# Patient Record
Sex: MALE | Race: WHITE
[De-identification: names, ages, dates, MRNs, and addresses within clinical notes are randomized per-mention and may not be internally consistent; named-entity substitution may affect disease eponyms.]

---

## 2018-10-01 ENCOUNTER — HOSPITAL ENCOUNTER (OUTPATIENT)
Dept: HOSPITAL 41 - JD.SDS | Age: 68
LOS: 1 days | Discharge: HOME | End: 2018-10-02
Attending: ORTHOPAEDIC SURGERY
Payer: MEDICARE

## 2018-10-01 DIAGNOSIS — G60.9: ICD-10-CM

## 2018-10-01 DIAGNOSIS — M10.9: ICD-10-CM

## 2018-10-01 DIAGNOSIS — G47.33: ICD-10-CM

## 2018-10-01 DIAGNOSIS — E03.9: ICD-10-CM

## 2018-10-01 DIAGNOSIS — M17.11: Primary | ICD-10-CM

## 2018-10-01 DIAGNOSIS — Z79.899: ICD-10-CM

## 2018-10-01 DIAGNOSIS — Z99.89: ICD-10-CM

## 2018-10-01 PROCEDURE — C1776 JOINT DEVICE (IMPLANTABLE): HCPCS

## 2018-10-01 RX ADMIN — DEXTROSE ONE GM: 5 SOLUTION INTRAVENOUS at 14:36

## 2018-10-01 RX ADMIN — IODINE ONE ML: 20; 20.4; 47 LIQUID TOPICAL at 14:25

## 2018-10-01 RX ADMIN — IODINE ONE ML: 20; 20.4; 47 LIQUID TOPICAL at 14:04

## 2018-10-01 RX ADMIN — SODIUM CHLORIDE ONE MG: 9 INJECTION, SOLUTION INTRAMUSCULAR; INTRAVENOUS; SUBCUTANEOUS at 14:05

## 2018-10-01 RX ADMIN — SODIUM CHLORIDE ONE: 9 INJECTION, SOLUTION INTRAMUSCULAR; INTRAVENOUS; SUBCUTANEOUS at 17:59

## 2018-10-01 RX ADMIN — DEXTROSE ONE GM: 5 SOLUTION INTRAVENOUS at 14:05

## 2018-10-01 RX ADMIN — OXYCODONE HYDROCHLORIDE AND ACETAMINOPHEN PRN TAB: 5; 325 TABLET ORAL at 22:47

## 2018-10-01 RX ADMIN — SODIUM CHLORIDE ONE MG: 9 INJECTION, SOLUTION INTRAMUSCULAR; INTRAVENOUS; SUBCUTANEOUS at 14:33

## 2018-10-01 NOTE — PCM.POSTAN
POST ANESTHESIA ASSESSMENT





- MENTAL STATUS


Mental Status: Alert, Oriented





- VITAL SIGNS


Pulse Rate: 61


SaO2: 97


Resp Rate: 16


Blood Pressure: 102/63


Temperature: 37.2 C





- RESPIRATORY


Respiratory Status: Respiratory Rate WNL, Airway Patent, O2 Saturation Stable, 

Supplemental Oxygen





- CARDIOVASCULAR


CV Status: Pulse Rate WNL, Blood Pressure Stable





- GASTROINTESTINAL


GI Status: No Symptoms





- PAIN


Pain Score: 0





- POST OP HYDRATION


Hydration Status: Adequate & Stable

## 2018-10-01 NOTE — PCM.SN
- Free Text/Narrative


Note: 





Right selective femoral nerve block at the adductor canal for post-procedure 

pain control


Time Out: 1520


Start: 1526


End:  1529





Chart reviewed.  Consent signed.  Questions answered. Appropriate monitors 

applied.  Time out performed. Right mid-shaft femur evaluated with ultrasound.  

Scanning medially femur, I was able to identify the femoral artery in the 

adductor canal.  The saphenous nerve was lateral to the artery. The skin was 

prepped lateral to the ultrasound probe with chlorahexadine. The 21ga 4 

insulated block needle was inserted under direct ultrasound guidance into the 

adductor canal.  20mL of 0.5% ropivacaine with 1:200,000 epinephrine was 

injected cirmcumferentially about the nerve with intermittent negative 

aspiration every 5mL.  Patient tolerated the procedure well.  See pictures on 

progress note and vital signs on nurses notes.  Block completed 

postoperatively.  





Harjeet Montenegro CRNA

## 2018-10-01 NOTE — PCM.SN
- Free Text/Narrative


Note: 


In to see Torres s/p R TKA POD 0. Overall he is doing well. He has no 

complaints at this time. Pain is controlled. Denies any HA, N/V/D, abdominal 

pain, Chest pain, SOB, numbness/tingling,  complaints. O2 2L NC. He does not 

have a urinary catheter. Incentive Spirometry. Neurovascularly intact with 2+ 

pulses in bilateral lower extremities. Bandage is dry and intact. He is working 

with PT. No concerns from nursing. Will likely be D/C'd tomorrow pending 

clinical disposition and labs.

## 2018-10-02 RX ADMIN — OXYCODONE HYDROCHLORIDE AND ACETAMINOPHEN PRN TAB: 5; 325 TABLET ORAL at 03:48

## 2018-10-02 RX ADMIN — OXYCODONE HYDROCHLORIDE AND ACETAMINOPHEN PRN TAB: 5; 325 TABLET ORAL at 10:52

## 2018-10-02 RX ADMIN — OXYCODONE HYDROCHLORIDE AND ACETAMINOPHEN PRN TAB: 5; 325 TABLET ORAL at 08:09

## 2018-10-02 NOTE — PCM.SURGPN
- General Info


Date of Service: 10/02/18


POD#: 1


Functional Status: Reports: Pain Controlled, Tolerating Diet, Ambulating, 

Urinating, Incentive Spirometry





- Review of Systems


Musculoskeletal: Reports: Other (The pt states his pain is tolerable.)





- Patient Data


Vitals - Most Recent: 


 Last Vital Signs











Temp  97.2 F   10/02/18 03:34


 


Pulse  59 L  10/02/18 03:34


 


Resp  16   10/02/18 03:34


 


BP  130/78   10/02/18 03:34


 


Pulse Ox  97   10/02/18 03:34











Weight - Most Recent: 218 lb


I&O - Last 24 Hours: 


 Intake & Output











 10/01/18 10/02/18 10/02/18





 22:59 06:59 14:59


 


Intake Total 905 246 


 


Output Total 1525 350 


 


Balance -620 -104 











Lab Results Last 24 Hrs: 


 Laboratory Results - last 24 hr











  10/01/18 10/02/18 10/02/18 Range/Units





  10:50 05:45 05:45 


 


WBC   13.74 H   (4.23-9.07)  K/mm3


 


RBC   4.10 L   (4.63-6.08)  M/mm3


 


Hgb   13.6 L   (13.7-17.5)  gm/L


 


Hct   40.6   (40.1-51.0)  %


 


MCV   99.0 H   (79.0-92.2)  fl


 


MCH   33.2 H   (25.7-32.2)  pg


 


MCHC   33.5   (32.2-35.5)  g/dl


 


RDW Std Deviation   45.1 H   (35.1-43.9)  fL


 


Plt Count   143 L   (163-337)  K/mm3


 


MPV   11.5   (9.4-12.3)  fl


 


PT  10.8    (9.5-12.1)  SECONDS


 


INR  0.99    


 


Sodium    140  (136-145)  mEq/L


 


Potassium    3.9  (3.5-5.1)  mEq/L


 


Chloride    107  ()  mEq/L


 


Carbon Dioxide    25  (21-32)  mEq/L


 


Anion Gap    11.9  (5-15)  


 


BUN    17  (7-18)  mg/dL


 


Creatinine    1.0  (0.7-1.3)  mg/dL


 


Est Cr Clr Drug Dosing    67.94  mL/min


 


Estimated GFR (MDRD)    > 60  (>60)  mL/min


 


BUN/Creatinine Ratio    17.0  (14-18)  


 


Glucose    114  ()  mg/dL


 


Calcium    8.6  (8.5-10.1)  mg/dL


 


Total Bilirubin    0.8  (0.2-1.0)  mg/dL


 


AST    21  (15-37)  U/L


 


ALT    26  (16-63)  U/L


 


Alkaline Phosphatase    39 L  ()  U/L


 


Total Protein    6.4  (6.4-8.2)  g/dl


 


Albumin    3.2 L  (3.4-5.0)  g/dl


 


Globulin    3.2  gm/dL


 


Albumin/Globulin Ratio    1.0  (1-2)  











Med Orders - Current: 


 Current Medications





Allopurinol (Zyloprim)  300 mg PO DAILY [facility]


Aspirin (Ecotrin)  325 mg PO BID [facility]


Bisacodyl (Dulcolax)  5 mg PO DAILY PRN


   PRN Reason: Constipation


Cholecalciferol (Vitamin D3)  5,000 unit PO DAILY [facility]


Cyclobenzaprine HCl (Flexeril)  10 mg PO TID PRN


   PRN Reason: Spasms


Docusate Sodium (Colace)  100 mg PO BID [facility]


   Last Admin: 10/01/18 20:42 Dose:  100 mg


Famotidine (Pepcid)  20 mg PO Q12H [facility]


   Last Admin: 10/01/18 20:42 Dose:  20 mg


Cefazolin Sodium/Dextrose 2 gm (/ Premix)  50 mls @ 100 mls/hr IV Q8H [facility]


   Stop: 10/02/18 13:29


   Last Admin: 10/02/18 05:12 Dose:  100 mls/hr


Ketorolac Tromethamine (Toradol)  15 mg IVPUSH Q6H PRN


   PRN Reason: Pain


Levothyroxine Sodium (Synthroid)  88 mcg PO ACBREAKFAST [facility]


   Last Admin: 10/02/18 05:10 Dose:  88 mcg


Magnesium Hydroxide (Milk Of Magnesia)  30 ml PO BID PRN


   PRN Reason: Constipation


Morphine Sulfate (Morphine)  2 mg IVPUSH Q2H PRN


   PRN Reason: Breakthrough Pain


Multivitamins (Thera)  1 each PO DAILY [facility]


Naloxone HCl (Narcan)  0.1 mg IVPUSH Q5M PRN


   PRN Reason: Oversedation


Ondansetron HCl (Zofran)  4 mg IVPUSH Q6H PRN


   PRN Reason: Nausea/Vomiting


Oxycodone/Acetaminophen (Percocet 325-5 Mg)  1 - 2 tab PO Q4H PRN


   PRN Reason: Pain


   Last Admin: 10/02/18 03:48 Dose:  1 tab


Senna (Senna)  8.6 mg PO BID PRN


   PRN Reason: Constipation





Discontinued Medications





Acetaminophen (Tylenol)  650 mg PO ONETIME [facility]


   Stop: 10/01/18 15:00


   Last Admin: 10/01/18 12:11 Dose:  650 mg


Bupivacaine HCl (Marcaine 0.25%) Confirm Administered Dose 30 ml .ROUTE .STK-

MED ONE


   Stop: 10/01/18 12:08


   Last Admin: 10/01/18 14:34 Dose:  30 ml


Cefazolin Sodium (Ancef) Confirm Administered Dose 2 gm .ROUTE .STK-MED ONE


   Stop: 10/01/18 12:08


   Last Admin: 10/01/18 14:28 Dose:  2 gm


Cefazolin Sodium (Ancef) Confirm Administered Dose 2 gm .ROUTE .STK-MED ONE


   Stop: 10/01/18 12:53


Morphine Sulfate 8 mg/Epinephrine HCl 0.3 mg/Cefuroxime Sodium 750 mg/Ketorolac 

Tromethamine 30 mg/Sodium Chloride 27.9 ml  0 mg .XX ONETIME ONE


   Stop: 10/01/18 13:31


   Last Admin: 10/01/18 17:59 Dose:  Not Given


Dexamethasone (Dexamethasone) Confirm Administered Dose 20 mg .ROUTE .STK-MED 

ONE


   Stop: 10/01/18 12:53


Diphenhydramine HCl (Benadryl)  25 mg IVPUSH Q6H PRN


   PRN Reason: Pruritis


   Stop: 10/01/18 23:00


Epinephrine HCl (Adrenalin) Confirm Administered Dose 1 mg .ROUTE .STK-MED ONE


   Stop: 10/01/18 08:21


Fentanyl (Sublimaze) Confirm Administered Dose 100 mcg .ROUTE .STK-MED ONE


   Stop: 10/01/18 12:45


Fentanyl (Sublimaze)  50 mcg IVPUSH Q5M PRN


   PRN Reason: Pain


   Stop: 10/01/18 23:00


Lactated Ringer's (Ringers, Lactated)  1,000 mls @ 125 mls/hr IV ASDIRECTED [facility]


   Stop: 10/01/18 23:00


   Last Admin: 10/01/18 10:55 Dose:  125 mls/hr


Lactated Ringer's (Ringers, Lactated) Confirm Administered Dose 1,000 mls @ as 

directed .ROUTE .ST-MED ONE


   Stop: 10/01/18 13:28


Iodine (Iodine 2% Mild Tincture) Confirm Administered Dose 30 ml .ROUTE .STK-

MED ONE


   Stop: 10/01/18 12:08


   Last Admin: 10/01/18 14:25 Dose:  18 ml


Ketamine HCl (Ketalar) Confirm Administered Dose 500 mg .ROUTE .STK-MED ONE


   Stop: 10/01/18 12:45


Lidocaine/Sodium Bicarbonate (Buffered Lidocaine 1% In Ns 8.4%)  0.25 ml IDERM 

ONETIME PRN


   PRN Reason: Prior to IV Start


   Stop: 10/01/18 18:00


   Last Admin: 10/01/18 10:55 Dose:  0.25 ml


Meperidine HCl (Meperidine)  12.5 mg IVPUSH ONETIME PRN


   PRN Reason: Shivering


   Stop: 10/01/18 23:00


Midazolam HCl (Versed 1 Mg/Ml) Confirm Administered Dose 2 mg .ROUTE .STK-MED 

ONE


   Stop: 10/01/18 12:45


Non-Formulary Medication (Chromium Picolinate [Chromium Picolinate])  400 mcg 

PO DAILY [facility]


Ondansetron HCl (Zofran) Confirm Administered Dose 4 mg .ROUTE .STK-MED ONE


   Stop: 10/01/18 12:53


Ondansetron HCl (Zofran)  4 mg IVPUSH ONETIME PRN


   PRN Reason: Nausea/Vomiting


   Stop: 10/01/18 23:00


Oxycodone HCl (Oxycontin)  10 mg PO ONETIME [facility]


   Stop: 10/01/18 16:00


   Last Admin: 10/01/18 12:10 Dose:  10 mg


Pregabalin (Lyrica)  50 mg PO ONETIME [facility]


   Stop: 10/01/18 15:00


   Last Admin: 10/01/18 12:10 Dose:  50 mg


Propofol (Diprivan  20 Ml) Confirm Administered Dose 600 mg .ROUTE .STK-MED ONE


   Stop: 10/01/18 12:38


Propofol (Diprivan  20 Ml) Confirm Administered Dose 200 mg .ROUTE .STK-MED ONE


   Stop: 10/01/18 14:42


Ropivacaine (Naropin 0.5%) Confirm Administered Dose 30 ml .ROUTE .STK-MED ONE


   Stop: 10/01/18 08:22


Sodium Chloride (Saline Flush)  10 ml FLUSH ASDIRECTED PRN


   PRN Reason: Keep Vein Open


   Stop: 10/01/18 18:00


Tranexamic Acid (Cyklokapron) Confirm Administered Dose 1,000 mg .ROUTE .STK-

MED ONE


   Stop: 10/01/18 12:08


   Last Admin: 10/01/18 14:40 Dose:  1,000 mg


Vancomycin HCl (Vancomycin) Confirm Administered Dose 1 gm .ROUTE .STK-MED ONE


   Stop: 10/01/18 12:08


   Last Admin: 10/01/18 14:36 Dose:  1 gm











- Exam


Wound/Incisions: Dressing Dry and Intact


General: Alert, Cooperative, No Acute Distress


Lungs: Normal Respiratory Effort


Extremities: Other (NVS intact for BLE.  Jami's negative.)





- Problem List Review


Problem List Initiated/Reviewed/Updated: Yes





- My Orders


Last 24 Hours: 


 Active Orders 24 hr











 Category Date Time Status


 


 Patient Status [ADT] Routine ADT  10/01/18 07:11 Active


 


 Ambulate [RC] PER UNIT ROUTINE Care  10/01/18 07:11 Active


 


 Communication Order [RC] ROUTINE Care  10/01/18 15:11 Active


 


 Cooling Warming Measures [RC] ASDIRECTED Care  10/01/18 15:11 Inactive


 


 May Shower [RC] ASDIRECTED Care  10/01/18 07:11 Active


 


 Notify Provider Consults [RC] ASDIRECTED Care  10/01/18 07:14 Active


 


 Notify Provider [RC] ASDIRECTED Care  10/01/18 15:11 Active


 


 Oxygen Therapy [RC] .PRN Care  10/01/18 15:11 Active


 


 Pulse Oximetry [RC] ASDIRECTED Care  10/01/18 15:11 Active


 


 RT Incentive Spirometry [RC] Q1HWA Care  10/01/18 07:10 Active


 


 Ready for Discharge [RC] PER UNIT ROUTINE Care  10/02/18 07:52 Ordered


 


 Up to Chair [RC] ASDIRECTED Care  10/01/18 07:11 Active


 


 Urinary Catheter Removal [RC] 2325 Care  10/01/18 07:10 Active


 


 Vital Signs [RC] Q15M Care  10/01/18 15:11 Inactive


 


 Vital Signs [RC] Q4HR Care  10/01/18 07:11 Active


 


 Consult to Physician [CONS] Routine Cons  10/01/18 07:11 Active


 


 OT Evaluation and Treatment [CONS] Routine Cons  10/01/18 07:10 Active


 


 PT Evaluation and Treatment [CONS] Routine Cons  10/01/18 07:10 Active


 


 Regular Diet [DIET] Diet  10/01/18 Lunch Active


 


 Knee 1V or 2V Rt [CR] Timed Exams  10/01/18 07:10 Taken


 


 Acetaminophen/oxyCODONE [Percocet 325-5 MG] Med  10/01/18 07:10 Active





 1 - 2 tab PO Q4H PRN   


 


 Allopurinol [Zyloprim] Med  10/02/18 09:00 Active





 300 mg PO DAILY   


 


 Aspirin [Ecotrin] Med  10/02/18 09:00 Active





 325 mg PO BID   


 


 Bisacodyl [Dulcolax] Med  10/01/18 07:11 Active





 5 mg PO DAILY PRN   


 


 Cholecalciferol (Vitamin D3) [Vitamin D3] Med  10/02/18 09:00 Active





 5,000 unit PO DAILY   


 


 Cyclobenzaprine [Flexeril] Med  10/01/18 07:10 Active





 10 mg PO TID PRN   


 


 Docusate Sodium [Colace] Med  10/01/18 09:00 Active





 100 mg PO BID   


 


 Famotidine [Pepcid] Med  10/01/18 21:00 Active





 20 mg PO Q12H   


 


 Ketorolac [Toradol] Med  10/01/18 07:10 Active





 15 mg IVPUSH Q6H PRN   


 


 Levothyroxine [Synthroid] Med  10/02/18 06:00 Active





 88 mcg PO ACBREAKFAST   


 


 Magnesium Hydroxide [Milk of Magnesia] Med  10/01/18 07:11 Active





 30 ml PO BID PRN   


 


 Morphine Med  10/01/18 07:11 Active





 2 mg IVPUSH Q2H PRN   


 


 Multivitamins,Therapeutic [Thera] Med  10/02/18 09:00 Active





 1 each PO DAILY   


 


 Naloxone [Narcan] Med  10/01/18 07:11 Active





 0.1 mg IVPUSH Q5M PRN   


 


 Ondansetron [Zofran] Med  10/01/18 07:11 Active





 4 mg IVPUSH Q6H PRN   


 


 Sennosides [Senna] Med  10/01/18 07:11 Active





 8.6 mg PO BID PRN   


 


 ceFAZolin [Ancef] 2 gm Med  10/01/18 21:00 Active





 Premix Bag 1 bag   





 IV Q8H   


 


 Antiembolic Hose [OM.PC] Per Unit Routine Oth  10/01/18 07:13 Ordered


 


 Ice Therapy [OM.PC] Per Unit Routine Oth  10/01/18 07:12 Ordered


 


 Sequential Compression Device [OM.PC] Per Unit Routine Oth  10/01/18 07:10 

Ordered


 


 Resuscitation Status Routine Resus Stat  10/01/18 07:11 Ordered








 Medication Orders





Allopurinol (Zyloprim)  300 mg PO DAILY [facility]


Aspirin (Ecotrin)  325 mg PO BID [facility]


Bisacodyl (Dulcolax)  5 mg PO DAILY PRN


   PRN Reason: Constipation


Cholecalciferol (Vitamin D3)  5,000 unit PO DAILY [facility]


Cyclobenzaprine HCl (Flexeril)  10 mg PO TID PRN


   PRN Reason: Spasms


Docusate Sodium (Colace)  100 mg PO BID [facility]


   Last Admin: 10/01/18 20:42  Dose: 100 mg


   Admin: 10/01/18 16:49 Dose:  Not Given


Famotidine (Pepcid)  20 mg PO Q12H [facility]


   Last Admin: 10/01/18 20:42  Dose: 20 mg


Cefazolin Sodium/Dextrose 2 gm (/ Premix)  50 mls @ 100 mls/hr IV Q8H [facility]


   Stop: 10/02/18 13:29


   Last Admin: 10/02/18 05:12  Dose: 100 mls/hr


   Infusion: 10/01/18 21:13  Dose: 100 mls/hr


   Admin: 10/01/18 20:43  Dose: 100 mls/hr


Ketorolac Tromethamine (Toradol)  15 mg IVPUSH Q6H PRN


   PRN Reason: Pain


Levothyroxine Sodium (Synthroid)  88 mcg PO ACBREAKFAST [facility]


   Last Admin: 10/02/18 05:10  Dose: 88 mcg


Magnesium Hydroxide (Milk Of Magnesia)  30 ml PO BID PRN


   PRN Reason: Constipation


Morphine Sulfate (Morphine)  2 mg IVPUSH Q2H PRN


   PRN Reason: Breakthrough Pain


Multivitamins (Thera)  1 each PO DAILY [facility]


Naloxone HCl (Narcan)  0.1 mg IVPUSH Q5M PRN


   PRN Reason: Oversedation


Ondansetron HCl (Zofran)  4 mg IVPUSH Q6H PRN


   PRN Reason: Nausea/Vomiting


Oxycodone/Acetaminophen (Percocet 325-5 Mg)  1 - 2 tab PO Q4H PRN


   PRN Reason: Pain


   Last Admin: 10/02/18 03:48  Dose: 1 tab


   Admin: 10/01/18 22:47  Dose: 1 tab


Senna (Senna)  8.6 mg PO BID PRN


   PRN Reason: Constipation











- Assessment


Assessment           (Free Text/Narrative):: 





POD#1 - right TKA





- Plan


Plan                        (Free Text/Narrative):: 





1.  Discharge to home today.


2.  325mg ASA PO BID.  Frequent mobility, TEDs.


3.  Hgb 13.6.


4.  Outpatient therapy.





The pt's case was discussed with Dr. Gonzalez.

## 2018-10-02 NOTE — CR
Right knee: AP and lateral views of the right knee were obtained.

 

Comparison: No previous knee exam.

 

Knee prosthesis is seen.  Components are aligned.  Underlying bony 

structures are intact.  Soft tissue air noted from the surgical 

procedure.  No fracture or other abnormality is seen.

 

Impression:

1.  Satisfactory postoperative radiographic appearance of recently 

placed right knee prosthesis.

 

Diagnostic code #2

## 2018-10-02 NOTE — PCM.SN
- Free Text/Narrative


Note: 





In to see Torres. He is S/P right TKA, post-op day 1. He is doing well. 

Currently working with PT. He denies any chest pain, palpitations, SOB, N/V/D, 

or abdominal pain. He has urinated. Labs and vital signs today look good. Pain 

is controlled. No nursing or patient concerns. Physical exam is unremarkable 

with no abdominal pain, clear lung sounds, and normal rate/rhythm. Pulses are 2

+ bilaterally on upper and lower extremities with no neurological deficits. 

From a hospitalist standpoint he is cleared for discharge pending primary team 

and PT/OT agreement.

## 2018-10-08 NOTE — PCM.OPNOTE
- General Post-Op/Procedure Note


Date of Surgery/Procedure: 10/01/18


Operative Procedure(s): right total knee arthroplasty


Pre Op Diagnosis: right knee osteoarthrosis


Post-Op Diagnosis: Same


Anesthesia Technique: Local, MAC, Spinal


Primary Surgeon: Joseph Gonzalez


Anesthesia Provider: Harjeet Montenegro


Assistant: Precious Perez


Assistant: Ludivina Morales


EBLINDA in mLs: 300


Complications: None


Condition: Good


Free Text/Narrative:: 





size 6/6


9mm


35x10

## 2018-10-08 NOTE — OR
DATE OF OPERATION:  10/01/2018

 

SURGEON:  Joseph Gonzalez MD

 

OPERATION PERFORMED:  Right total knee arthroplasty.

 

PREOPERATIVE DIAGNOSIS:

Right knee osteoarthrosis.

 

POSTOPERATIVE DIAGNOSIS:

Right knee osteoarthrosis.

 

ANESTHESIA:

Local MAC with spinal.

 

ANESTHESIA PROVIDER:

Harjeet Montenegro.

 

ASSISTANT:

1. Precious Perez PA-C.

2. Ludivina Morales LPN.

 

ESTIMATED BLOOD LOSS:

300 mL

 

COMPLICATIONS:

None.

 

CONDITION:

Stable.

 

IMPLANTS:

1. Shattuck size 6 press-fit CR femur.

2. Shattuck size 6 press-fit tibial base plate.

3. Giuseppe size 6, 9 mm CS polyethylene insert.

4. Shattuck size 35 x 10 mm press-fit patella.

 

DESCRIPTION OF PROCEDURE:

The patient was identified in the preop holding area.  Proper site was marked

and identified by the surgeon.  The patient was taken back to the operating

theater.  After adequate anesthesia, the patient's right lower extremity had a

nonsterile tourniquet applied and it was then sterilely prepped and draped in

the usual sterile fashion.  OR timeout was performed.  The patient received 2 g

IV Ancef.  At this time, right lower extremity was exsanguinated.  Tourniquet

was insufflated to 300 mmHg.  Standard medial parapatellar incision was made.

Medial parapatellar arthrotomy was created.  Deep fibers of the MCL were raised

and anterior fat pad was resected.  At this time, attention was turned to the

patella.  Patella measured 24, it was resected to a 14 for 35 x 10 mm patella.

Drill holes were then drilled and found to be in adequate position.  The drill

was then drilled in the distal femur and the intramedullary distal femoral

cutting guide was then placed.  8 mm was resected off the distal femur and was

found to be an adequate resection.  Sizing guide was placed.  It was found to be

a size 6 press-fit CR femur that was shown on the implant record at the

beginning of this dictation.  The drill holes were drilled for the epicondylar

axis using Whitesides line and epicondyles as reference.  At this time, the 4-in-

1 cutting block was placed.  An anterior posterior and anterior and posterior

chamfer cuts were then completed.  The correct size box cut was then placed and

the box cut was completed and found to be an adequate resection.  Attention was

turned to the tibia.  The posterior medial lateral retractors were placed.  The

extramedullary tibial guide was placed.  It was placed in the old footprint of

the ACL.  It was aligned with the center of the ankle and 0 degrees of slope, 9

mm was then resected off the unaffected lateral side.  There was found to be an

acceptable reduction.  At this time, posterior osteophytes were removed along

with medial and lateral meniscus.  A trial implant was placed with a correct

sized tibia that was mentioned at the beginning of the dictation.  A Shattuck

size 6, 9 mm CS polyethylene was then placed.  The patient's knee was brought

through range of motion.  The patella was tracking centrally and was stable to

varus and valgus stress.  Alignment was found to be roughly at 0 degrees.  At

this time, cement was mixed on the back table.  The tibia was stamped and

drilled in proper rotation.  All cut surfaces were irrigated with pulse lavage

irrigation with Ancef and then completely dried.  Once this was completed, then

the cement was ready.  The universal tibial base plate was cemented in place.

Next, the Shattuck size 6 press-fit CR femur cemented into place and the Shattuck

size 6, 9 mm CS polyethylene was placed.  The patient's knee was brought into

full extension.  Excess cement was removed.  The patella was then cemented in

place at this time.  Tourniquet was deflated.  One liter dilute Betadine

solution was irrigated through the knee along with 3 L of pulse lavage

irrigation with Ancef.  Periarticular injection was then completed.  The

patient's knee was brought through a range of motion.  Once the cement had time

to set up and it was found to be stable to varus valgus stress, the patella was

tracking centrally with full range of motion.  At this time, a #2 barbed suture

was used for closure of the medial parapatellar arthrotomy.  Topical tranexamic

acid was placed.  2-0 Vicryl was used

subcutaneously, a running 3-0 Monocryl was used subcuticularly.  The patient

tolerated the procedure well and was sent to the PACU in stable condition.

 

DD:  10/08/2018 09:50:06

DT:  10/08/2018 10:02:16  ERWIN

Job #:  [id number]